# Patient Record
Sex: FEMALE | Race: WHITE | URBAN - METROPOLITAN AREA
[De-identification: names, ages, dates, MRNs, and addresses within clinical notes are randomized per-mention and may not be internally consistent; named-entity substitution may affect disease eponyms.]

---

## 2021-04-06 ENCOUNTER — OFFICE VISIT (OUTPATIENT)
Dept: URBAN - METROPOLITAN AREA CLINIC 23 | Facility: CLINIC | Age: 76
End: 2021-04-06
Payer: COMMERCIAL

## 2021-04-06 DIAGNOSIS — K57.30 COLON, DIVERTICULOSIS: ICD-10-CM

## 2021-04-06 DIAGNOSIS — R10.84 GENERALIZED ABDOMINAL PAIN: ICD-10-CM

## 2021-04-06 PROCEDURE — G8427 DOCREV CUR MEDS BY ELIG CLIN: HCPCS | Performed by: INTERNAL MEDICINE

## 2021-04-06 PROCEDURE — G8417 CALC BMI ABV UP PARAM F/U: HCPCS | Performed by: INTERNAL MEDICINE

## 2021-04-06 PROCEDURE — 3017F COLORECTAL CA SCREEN DOC REV: CPT | Performed by: INTERNAL MEDICINE

## 2021-04-06 PROCEDURE — 99213 OFFICE O/P EST LOW 20 MIN: CPT | Performed by: INTERNAL MEDICINE

## 2021-04-06 PROCEDURE — G9622 NO UNHEAL ETOH USER: HCPCS | Performed by: INTERNAL MEDICINE

## 2021-04-06 PROCEDURE — G9903 PT SCRN TBCO ID AS NON USER: HCPCS | Performed by: INTERNAL MEDICINE

## 2021-04-06 PROCEDURE — 1036F TOBACCO NON-USER: CPT | Performed by: INTERNAL MEDICINE

## 2021-04-06 RX ORDER — CYANOCOBALAMIN (VITAMIN B-12) 500 MCG
TABLET ORAL
Qty: 0 | Refills: 0 | Status: ACTIVE | COMMUNITY
Start: 1900-01-01

## 2021-04-06 NOTE — PREVIOUS WORKUP REVIEWED
:ENDOSCOPIES-Colonoscopy 5/21/2019: Diverticulosis. Internal hemorrhoids. Repeat colonoscopy in 5 years.-Colonoscopy 1/29/2016: 5 polyps in the rectum and sigmoid colon, size between 4-6 mm. A 14 mm polyp in the ascending colon. Diverticulosis.*Pathology: Sigmoid colon polyp-hyperplastic polyp. Ascending colon polyp-tubular adenoma. Rectum polyp-hyperplastic polyp.-Colonoscopy 12/14/2010: 3 polyps in the rectum, 3-8 mm in size. Diverticulosis. Internal hemorrhoids. Hemorrhoids banding x2. Normal TI.*Pathology: Rectal polyp-hyperplastic polyp.IMAGES -CT abdomen pelvis with contrast 11/2/2010: Extensive diverticulosis in the descending and sigmoid colon. No evidence of diverticulitis. Long segment of mild wall thickening involving the descending colon just distal to the splenic flexure. No adjacent inflammatory change.

## 2021-04-06 NOTE — HPI-TODAY'S VISIT:
75-year-old  female presents for lower abdominal pain.  Acute onset.  3 weeks ago.  Lasted 2-3 days.  Denies blood in stool, constipation, diarrhea. She has questionable history of UC, many years ago.  Not on treatment, and negative colonoscopy multiple times since then.  At the time of UC dx, she was using excessive amount of NSAIDs. She has history of diverticulosis but no previous history of diverticulitis.  Denies urinary tract infection symptoms.

## 2021-04-14 ENCOUNTER — TELEPHONE ENCOUNTER (OUTPATIENT)
Dept: URBAN - METROPOLITAN AREA CLINIC 77 | Facility: CLINIC | Age: 76
End: 2021-04-14

## 2021-05-30 ENCOUNTER — DASHBOARD ENCOUNTERS (OUTPATIENT)
Age: 76
End: 2021-05-30

## 2021-05-30 PROBLEM — 429047008: Status: ACTIVE | Noted: 2021-05-30

## 2021-05-30 PROBLEM — 733657002: Status: ACTIVE | Noted: 2021-05-30

## 2024-06-28 ENCOUNTER — LAB OUTSIDE AN ENCOUNTER (OUTPATIENT)
Dept: URBAN - METROPOLITAN AREA CLINIC 23 | Facility: CLINIC | Age: 79
End: 2024-06-28

## 2024-06-28 ENCOUNTER — OFFICE VISIT (OUTPATIENT)
Dept: URBAN - METROPOLITAN AREA CLINIC 23 | Facility: CLINIC | Age: 79
End: 2024-06-28
Payer: COMMERCIAL

## 2024-06-28 VITALS
SYSTOLIC BLOOD PRESSURE: 144 MMHG | DIASTOLIC BLOOD PRESSURE: 77 MMHG | TEMPERATURE: 64 F | WEIGHT: 180 LBS | BODY MASS INDEX: 30.73 KG/M2 | HEIGHT: 64 IN | HEART RATE: 64 BPM

## 2024-06-28 DIAGNOSIS — K57.30 DIVERTICULA, COLON: ICD-10-CM

## 2024-06-28 DIAGNOSIS — Z87.19 HISTORY OF ULCERATIVE COLITIS: ICD-10-CM

## 2024-06-28 DIAGNOSIS — Z79.82 ON ASPIRIN AT HOME: ICD-10-CM

## 2024-06-28 DIAGNOSIS — Z86.010 HISTORY OF ADENOMATOUS POLYP OF COLON: ICD-10-CM

## 2024-06-28 PROBLEM — 733657002: Status: ACTIVE | Noted: 2024-06-28

## 2024-06-28 PROCEDURE — 99203 OFFICE O/P NEW LOW 30 MIN: CPT | Performed by: INTERNAL MEDICINE

## 2024-06-28 RX ORDER — CYANOCOBALAMIN (VITAMIN B-12) 500 MCG
TABLET ORAL
Qty: 0 | Refills: 0 | Status: ACTIVE | COMMUNITY
Start: 1900-01-01

## 2024-06-28 NOTE — PREVIOUS WORKUP REVIEWED EXTERNAL MEDICAL RECORD
- , :ENDOSCOPIES-Colonoscopy 5/21/2019: Diverticulosis. Internal hemorrhoids. Repeat colonoscopy in 5 years.-Colonoscopy 1/29/2016: 5 polyps in the rectum and sigmoid colon, size between 4-6 mm. A 14 mm polyp in the ascending colon. Diverticulosis.*Pathology: Sigmoid colon polyp-hyperplastic polyp. Ascending colon polyp-tubular adenoma. Rectum polyp-hyperplastic polyp.-Colonoscopy 12/14/2010: 3 polyps in the rectum, 3-8 mm in size. Diverticulosis. Internal hemorrhoids. Hemorrhoids banding x2. Normal TI.*Pathology: Rectal polyp-hyperplastic polyp.IMAGES -CT abdomen pelvis with contrast 11/2/2010: Extensive diverticulosis in the descending and sigmoid colon. No evidence of diverticulitis. Long segment of mild wall thickening involving the descending colon just distal to the splenic flexure. No adjacent inflammatory change.

## 2024-06-28 NOTE — HPI-TODAY'S VISIT:
78-year-old female presents for colonoscopy for surveillance of colon polyps. She is doing well.  She moves bowel regularly.  No diarrhea or constipation.  An episode of left-sided abdominal pain about 3 months ago. Patient is on aspirin for primary prevention.

## 2024-06-28 NOTE — PHYSICAL EXAM CARDIOVASCULAR:
03/20/23                            Emma Russo  [id number] [facility] [id number]    To Whom It May Concern:    This is to certify Emma Russo was evaluated with Char Ford CNP on 03/20/23 and can return to regular work on 3/23/2023.     RESTRICTIONS: None            Electronically signed by:  Char Ford CNP  [facility] [address] [address]  Dept Phone: [phone]      S1, S2 normal, no murmurs, regular rate and rhythm,no peripheral edema

## 2024-08-15 ENCOUNTER — OFFICE VISIT (OUTPATIENT)
Dept: URBAN - METROPOLITAN AREA SURGERY CENTER 15 | Facility: SURGERY CENTER | Age: 79
End: 2024-08-15
Payer: COMMERCIAL

## 2024-08-15 ENCOUNTER — CLAIMS CREATED FROM THE CLAIM WINDOW (OUTPATIENT)
Dept: URBAN - METROPOLITAN AREA CLINIC 4 | Facility: CLINIC | Age: 79
End: 2024-08-15
Payer: COMMERCIAL

## 2024-08-15 DIAGNOSIS — Z86.010 ADENOMAS PERSONAL HISTORY OF COLONIC POLYPS: ICD-10-CM

## 2024-08-15 DIAGNOSIS — D12.3 ADENOMA OF TRANSVERSE COLON: ICD-10-CM

## 2024-08-15 DIAGNOSIS — D12.3 BENIGN NEOPLASM OF TRANSVERSE COLON: ICD-10-CM

## 2024-08-15 DIAGNOSIS — K57.30 DIVERTICULA OF COLON: ICD-10-CM

## 2024-08-15 DIAGNOSIS — Z86.010 PERSONAL HISTORY OF COLONIC POLYPS: ICD-10-CM

## 2024-08-15 DIAGNOSIS — Z09 ENCNTR FOR F/U EXAM AFT TRTMT FOR COND OTH THAN MALIG NEOPLM: ICD-10-CM

## 2024-08-15 PROCEDURE — 00812 ANES LWR INTST SCR COLSC: CPT | Performed by: NURSE ANESTHETIST, CERTIFIED REGISTERED

## 2024-08-15 PROCEDURE — 45385 COLONOSCOPY W/LESION REMOVAL: CPT | Performed by: INTERNAL MEDICINE

## 2024-08-15 PROCEDURE — 88305 TISSUE EXAM BY PATHOLOGIST: CPT | Performed by: PATHOLOGY

## 2024-08-15 PROCEDURE — 0529F INTRVL 3/>YR PTS CLNSCP DOCD: CPT | Performed by: INTERNAL MEDICINE

## 2024-08-15 RX ORDER — CYANOCOBALAMIN (VITAMIN B-12) 500 MCG
TABLET ORAL
Qty: 0 | Refills: 0 | Status: ACTIVE | COMMUNITY
Start: 1900-01-01